# Patient Record
(demographics unavailable — no encounter records)

---

## 2025-07-11 NOTE — PLAN
[TextEntry] : The patient was advised of the diagnosis. The natural history of the pathology was explained in full to the patient in layman's terms. All questions were answered. The risks and benefits of surgical and non-surgical treatment alternatives were explained in full to the patient.  Medication was injected into the left wrist CT area. After verbal consent using sterile preparation and technique. The risks, benefits, and alternatives to cortisone injection were explained in full to the patient. Risks outlined include but are not limited to infection, sepsis, bleeding, scarring, skin discoloration, temporary increase in pain, syncopal episode, failure to resolve symptoms, allergic reaction, symptom recurrence, and elevation of blood sugar in diabetics. Patient understood the risks. All questions were answered. After discussion of options, patient requested an injection. Oral informed consent was obtained and sterile prep was done of the injection site. Sterile technique was utilized for the procedure including the preparation of the solutions used for the injection. Patient tolerated the procedure well. Advised to ice the injection site this evening. Prep with Betadine locally to site. Sterile technique used. Patient tolerated procedure well. Post Procedure Instructions: Patient was advised to call if redness, pain, or fever occur and apply ice for 15 min. out of every hour for the next 12-24 hours as tolerated.  The patient was instructed on the importance of ice and elevation of the extremity to decrease swelling and pain.  Recommend over the counter medications on as needed basis.  Advised that is symptoms persist, consider getting an EMG.   "Written by Chanel Bonilla, acting as Scribe for Rogers Davies MD."   Dr. Davies - The documentation recorded by the scribe accurately reflects the service I personally performed, and the decisions made by me.

## 2025-07-11 NOTE — DISCUSSION/SUMMARY
[de-identified] : "Written by Makayla Champion, acting as Scribe for Rogers Davies MD."  Dr. Davies -  The documentation recorded by the scribe accurately reflects the service I personally performed and the decisions made by me.

## 2025-07-11 NOTE — PHYSICAL EXAM
[Normal Mood and Affect] : normal mood and affect [Able to Communicate] : able to communicate [Well Developed] : well developed [Well Nourished] : well nourished [5___] : finger abductor 5[unfilled]/5 [Left] : left wrist [] : positive tinel [2nd Finger] : 2nd finger [FreeTextEntry8] : Widening scapholunate interval 4.3 mm.  DISI deformity.

## 2025-07-11 NOTE — PROCEDURE
[Medium Joint Injection] : medium joint injection [Left] : of the left [Wrist] : wrist [Pain] : pain [Inflammation] : inflammation [Alcohol] : alcohol [Ethyl Chloride sprayed topically] : ethyl chloride sprayed topically [___ cc    40mg] : Methylprednisolone (Depomedrol) ~Vcc of 40 mg  [] : Patient tolerated procedure well [Call if redness, pain or fever occur] : call if redness, pain or fever occur [Apply ice for 15min out of every hour for the next 12-24 hours as tolerated] : apply ice for 15 minutes out of every hour for the next 12-24 hours as tolerated [Risks, benefits, alternatives discussed / Verbal consent obtained] : the risks benefits, and alternatives have been discussed, and verbal consent was obtained

## 2025-07-11 NOTE — DISCUSSION/SUMMARY
[de-identified] : "Written by Makayla Champoin, acting as Scribe for Rogers Davies MD."  Dr. Davies -  The documentation recorded by the scribe accurately reflects the service I personally performed and the decisions made by me.

## 2025-07-11 NOTE — HISTORY OF PRESENT ILLNESS
[7] : 7 [Localized] : localized [Tingling] : tingling [Intermittent] : intermittent [Lying in bed] : lying in bed [Retired] : Work status: retired [1] : 2 [9] : 9 [de-identified] : 07/11/25: Return visit for this 82 year old male ambidextrous, hx of lt CTS who noticed recurring painful pins and needles lt hand x last 1-2 weeks duration. Has occasional wake up pain at night. Wears a night splint with partial relief only. PMH: had similar sxs x 1 1/2 years ago , had a cortisone injection and did well.  02/09/24:  Return visit for a 80 year old male here today for numbness and some tingling in the left first four fingers that began about 4-5 months ago.  Mostly at night that wakes him and sometimes tingling in the day.  Gets some pain associated with this.  Had seen Dr. Salgado about 7 months ago who diagnosed carpal tunnel syndrome and gave him a cortisone injection.  PMH:  Has had similar symptoms on the right and had a CTR.  [] : no [FreeTextEntry1] : left  hand [FreeTextEntry9] : no treatment [de-identified] : 2024

## 2025-07-11 NOTE — HISTORY OF PRESENT ILLNESS
[7] : 7 [Localized] : localized [Tingling] : tingling [Intermittent] : intermittent [Lying in bed] : lying in bed [Retired] : Work status: retired [1] : 2 [9] : 9 [de-identified] : 07/11/25: Return visit for this 82 year old male ambidextrous, hx of lt CTS who noticed recurring painful pins and needles lt hand x last 1-2 weeks duration. Has occasional wake up pain at night. Wears a night splint with partial relief only. PMH: had similar sxs x 1 1/2 years ago , had a cortisone injection and did well.  02/09/24:  Return visit for a 80 year old male here today for numbness and some tingling in the left first four fingers that began about 4-5 months ago.  Mostly at night that wakes him and sometimes tingling in the day.  Gets some pain associated with this.  Had seen Dr. Salgado about 7 months ago who diagnosed carpal tunnel syndrome and gave him a cortisone injection.  PMH:  Has had similar symptoms on the right and had a CTR.  [] : no [FreeTextEntry1] : left  hand [FreeTextEntry9] : no treatment [de-identified] : 2024